# Patient Record
(demographics unavailable — no encounter records)

---

## 2024-11-23 NOTE — DATA REVIEWED
[FreeTextEntry1] : Labs 5/6/23 H/H 17.1/48.5 glucose 187 BUN/cr 10/.86 calcium 10.1 chol 166 tri 59 HDL 53  alb/cr ratio 64 TSH 1.95 Free T4 1.21 25 vitamin D 24.8 celiac disease panel negative  3/2/24 H/H 12.4/36 Hba1c 8.4% BUN/cr 10/.59 chol 123 HDL 39 LDL 73 tri 48 TSH 3.27

## 2024-11-23 NOTE — PHYSICAL EXAM
[Alert] : alert [No Acute Distress] : no acute distress [Normal Sclera/Conjunctiva] : normal sclera/conjunctiva [EOMI] : extra ocular movement intact [No LAD] : no lymphadenopathy [Thyroid Not Enlarged] : the thyroid was not enlarged [No Thyroid Nodules] : no palpable thyroid nodules [No Respiratory Distress] : no respiratory distress [Clear to Auscultation] : lungs were clear to auscultation bilaterally [Normal S1, S2] : normal S1 and S2 [Regular Rhythm] : with a regular rhythm [No Edema] : no peripheral edema [Normal Bowel Sounds] : normal bowel sounds [Not Tender] : non-tender [Not Distended] : not distended [Soft] : abdomen soft [Normal Anterior Cervical Nodes] : no anterior cervical lymphadenopathy [No Clubbing, Cyanosis] : no clubbing  or cyanosis of the fingernails [No Rash] : no rash [Right foot was examined, including] : right foot ~C was examined, including visual inspection with sensory and pulse exams [Left foot was examined, including] : left foot ~C was examined, including visual inspection with sensory and pulse exams [Normal] : normal [2+] : 2+ in the dorsalis pedis [Diminished Throughout Both Feet] : normal tactile sensation with monofilament testing throughout both feet [Normal Reflexes] : deep tendon reflexes were 2+ and symmetric [Normal Affect] : the affect was normal [Normal Mood] : the mood was normal [de-identified] : last foot exam in April 2024

## 2024-11-23 NOTE — ASSESSMENT
[Carbohydrate Consistent Diet] : carbohydrate consistent diet [Diabetes Foot Care] : diabetes foot care [Long Term Vascular Complications] : long term vascular complications of diabetes [Smoking Cessation] : smoking cessation [FreeTextEntry1] : 41 y.o. female with h/o Type 1 DM uncontrolled and vitamin D def.  1. Type 1 DM- Suboptimal control with Hba1c of 8.5% today. Encouraged a carbohydrate consistent diet and proper bolusing for meals and snacks. Omnipod 5 insulin pump download reviewed with 49% in range and 2% lows. Will change ICR to 8. Stressed importance of meal bolus and entering carb amounts. Encouraged patient to follow up with CDE regarding blood glucose levels in 4 to 8 weeks. Encouraged wearing medical alert. Had normal CMP in March 2024. Elevated urine alb/cr ratio in May 2023 and October 2023. We discussed starting ACE-I or ARB for renal protection, but she prefers to hold off for now and will recheck CMP and urine alb/cr ratio today.   2. BP is at goal  3. Hyperlipidemia- Will check lipids. Discussed starting statin for CV risk reduction and she prefers to hold off for now. Patient appears euthyroid. Will check TFTs.   4. Vitamin D def- Low 25 vitamin D level with negative screen for celiac disease in the past. Will check 25 vitamin D level and celiac disease panel. Recommend vitamin D3 2,000 Iu daily.  Follow up in 3 to 4 months.

## 2024-11-23 NOTE — PHYSICAL EXAM
[Alert] : alert [No Acute Distress] : no acute distress [Normal Sclera/Conjunctiva] : normal sclera/conjunctiva [EOMI] : extra ocular movement intact [No LAD] : no lymphadenopathy [Thyroid Not Enlarged] : the thyroid was not enlarged [No Thyroid Nodules] : no palpable thyroid nodules [No Respiratory Distress] : no respiratory distress [Clear to Auscultation] : lungs were clear to auscultation bilaterally [Normal S1, S2] : normal S1 and S2 [Regular Rhythm] : with a regular rhythm [No Edema] : no peripheral edema [Normal Bowel Sounds] : normal bowel sounds [Not Tender] : non-tender [Not Distended] : not distended [Soft] : abdomen soft [Normal Anterior Cervical Nodes] : no anterior cervical lymphadenopathy [No Clubbing, Cyanosis] : no clubbing  or cyanosis of the fingernails [No Rash] : no rash [Right foot was examined, including] : right foot ~C was examined, including visual inspection with sensory and pulse exams [Left foot was examined, including] : left foot ~C was examined, including visual inspection with sensory and pulse exams [Normal] : normal [2+] : 2+ in the dorsalis pedis [Diminished Throughout Both Feet] : normal tactile sensation with monofilament testing throughout both feet [Normal Reflexes] : deep tendon reflexes were 2+ and symmetric [Normal Affect] : the affect was normal [Normal Mood] : the mood was normal [de-identified] : last foot exam in April 2024

## 2024-11-23 NOTE — THERAPY
[Today's Date] : [unfilled] [Novolog] : Novolog [Insulin on Board (IOB) Duration = ____ hours] : Insulin on Board (IOB) Duration  = [unfilled] hours [_____] :  [unfilled] mg/dL/U [de-identified] : Omnipod 5

## 2024-11-23 NOTE — THERAPY
[Today's Date] : [unfilled] [Novolog] : Novolog [Insulin on Board (IOB) Duration = ____ hours] : Insulin on Board (IOB) Duration  = [unfilled] hours [_____] :  [unfilled] mg/dL/U [de-identified] : Omnipod 5

## 2024-11-23 NOTE — REVIEW OF SYSTEMS
[Fatigue] : fatigue [Nausea] : nausea [Headaches] : headaches [Cold Intolerance] : cold intolerance [Negative] : Integumentary [Polyuria] : no polyuria [Irregular Menses] : regular menses [Pain/Numbness of Digits] : no pain/numbness of digits [Polydipsia] : no polydipsia [Swelling] : no swelling [FreeTextEntry7] : abdominal fullness at times

## 2024-11-23 NOTE — HISTORY OF PRESENT ILLNESS
[Continuous Glucose Monitoring] : Continuous Glucose Monitoring: Yes [Dexcom] : Dexcom 98 [FreeTextEntry1] : 41 y.o. female with h/o Type 1 DM uncontrolled, hyperlipidemia and vitamin D def here for follow up visit.   No acute events since the last visit.   S/p right eye surgery on 2/28/24 followed by facial cellulitis and required 5 days at [facility] treated with IV antibiotics.   Diagnosed with COVID-19 related pneumonia and COPD in 2/2023. Seeing pulmonary and started Trelegy but now off.     Did not receive COVID-19 vaccine. Did have COVID-19 in December 2021. She was using Medtronic pump and Dexcom G6.   Now using Omnipod 5 with Dexcom G6 since 12/2023.  Has been following with our CDE for insulin pump adjustments.   Patient reports hyperglycemia during the day. Reports less hypoglycemia but may happen during the day if more active. She is using bolus wizard more and is trying to be more consistent with manual bolus. UTD with glucagon kit, basal insulin and ketone strips at home. Medical alert is being fixed and not wearing it now.   UTD with ophthalmology (2024) and does have h/o retinopathy. S/p right tear duct issue. Still smoking a few cigarettes per day. No foot complaints. No polyuria and no polydipsia. Sees podiatry. Does have proteinuria but did not start ARB. Reports feeling full sooner with occasional nausea but stable. No vomiting or abdominal pain.    Diet: For breakfast: egg sandwich on a small roll For lunch: sandwich, chicken, stew or ravioli For dinner: usually skip and may have similar lunch choices Snacks during the day and usually does bolus Drink: Diet soda, water  Also vitamin d def, not taking vitamin D supplement because forgets. [FreeTextEntry2] : 49 [FreeTextEntry3] : 49 [FreeTextEntry4] : 2 [de-identified] : 7.8%

## 2024-11-23 NOTE — HISTORY OF PRESENT ILLNESS
[Continuous Glucose Monitoring] : Continuous Glucose Monitoring: Yes [Dexcom] : Dexcom [FreeTextEntry1] : 41 y.o. female with h/o Type 1 DM uncontrolled, hyperlipidemia and vitamin D def here for follow up visit.   No acute events since the last visit.   S/p right eye surgery on 2/28/24 followed by facial cellulitis and required 5 days at [facility] treated with IV antibiotics.   Diagnosed with COVID-19 related pneumonia and COPD in 2/2023. Seeing pulmonary and started Trelegy but now off.     Did not receive COVID-19 vaccine. Did have COVID-19 in December 2021. She was using Medtronic pump and Dexcom G6.   Now using Omnipod 5 with Dexcom G6 since 12/2023.  Has been following with our CDE for insulin pump adjustments.   Patient reports hyperglycemia during the day. Reports less hypoglycemia but may happen during the day if more active. She is using bolus wizard more and is trying to be more consistent with manual bolus. UTD with glucagon kit, basal insulin and ketone strips at home. Medical alert is being fixed and not wearing it now.   UTD with ophthalmology (2024) and does have h/o retinopathy. S/p right tear duct issue. Still smoking a few cigarettes per day. No foot complaints. No polyuria and no polydipsia. Sees podiatry. Does have proteinuria but did not start ARB. Reports feeling full sooner with occasional nausea but stable. No vomiting or abdominal pain.    Diet: For breakfast: egg sandwich on a small roll For lunch: sandwich, chicken, stew or ravioli For dinner: usually skip and may have similar lunch choices Snacks during the day and usually does bolus Drink: Diet soda, water  Also vitamin d def, not taking vitamin D supplement because forgets. [FreeTextEntry2] : 49 [FreeTextEntry3] : 49 [FreeTextEntry4] : 2 [de-identified] : 7.8%

## 2025-04-02 NOTE — PHYSICAL EXAM
[Bro Test Lateralizes To Right] : tone lateralization to the right [Midline] : trachea located in midline position [Normal] : no rashes [de-identified] : right TMJ tenderness [Hearing Loss Right Only] : normal [Hearing Loss Left Only] : normal [de-identified] : mildly inflamed turbinates [FreeTextEntry2] : diffusely tender over the right sinuses to percussion

## 2025-04-02 NOTE — DATA REVIEWED
[de-identified] : CT Sinuses 10/9/23: -possible old nasal fracture -small polyp in the floor of the right maxillary sinus -mucosal thickening in the right inferior meatus -deviated septum right with septal spur on the left

## 2025-04-02 NOTE — PROCEDURE
[Recalcitrant Symptoms] : recalcitrant symptoms  [Rigid Endoscope] : examined with a rigid endoscope [de-identified] : Dr. Maldonado [de-identified] : Facial pain, Acute sinusitis [FreeTextEntry6] :  Procedure: Rigid Nasal Endoscopy: Risks, benefits, and alternatives of rigid endoscopy were explained to the patient. The patient gave oral consent to proceed. The rigid scope was inserted into the right nasal cavity. Endoscopy of the inferior and middle meatus was performed. No polyp, mass, or lesion was appreciated. Olfactory cleft was clear. Spheno-ethmoid recess clear. Nasopharynx was clear. Turbinates were without mass, but inflamed right inferior and middle turbinate. The procedure was repeated on the contralateral side with similar findings. The right middle meatus was inflamed. -left middle meatus normal, but inflamed on the right -left middle turbinate normal, but inflamed on the right -inflamed right inferior turbinate

## 2025-04-02 NOTE — ADDENDUM
[FreeTextEntry1] :  Documented by Etienne Vinson acting as scribe for Dr. Maldonado on 04/02/2025. All Medical record entries made by the Scribe were at my, Dr. Maldonado, direction and personally dictated by me on 04/02/2025 . I have reviewed the chart and agree that the record accurately reflects my personal performance of the history, physical exam, assessment and plan. I have also personally directed, reviewed, and agreed with the discharge instructions.

## 2025-04-02 NOTE — ASSESSMENT
[FreeTextEntry1] :  Reviewed and reconciled medications, allergies, PMHx, PSHx, SocHx, FMHx.  Pt. with h/o of COPD, asthma, closed fracture nasal bone, facial pain, and blocked nasolacrimal duct s/p repair presents today stating that she had a sinus infection 4 weeks ago and she is still with ear pain, nasal pain, right cheek pain, and yellow nasal discharge. She denies issues with tearing. She states that her right nose has been dry since she had nasolacrimal duct surgery. She states that she has been on Levaquin from her PCP. She states that her face got very swollen after nasolacrimal duct surgery.  CT Sinuses 10/9/23: -possible old nasal fracture -small polyp in the floor of the right maxillary sinus -mucosal thickening in the right inferior meatus -deviated septum right with septal spur on the left  Physical exam: -NOSE score 40 -TNSS 4 -ears appear normal bilaterally -tuning forks lateralizes to the right ear -mildly inflamed turbinates -right TMJ tenderness -diffusely tender over the right sinuses to percussion  ENT Procedure: Rigid nasal endoscopy -left middle meatus normal, but inflamed on the right -left middle turbinate normal, but inflamed on the right -inflamed right inferior turbinate  Plan: CT scan reviewed and discussed with the patient. -Continue Levaquin -Use Afrin for 3 days -Ordered CT Sinuses for potential surgical planning once sinus infection resolves -FU with results from CT scan

## 2025-04-02 NOTE — CONSULT LETTER
[Dear  ___] : Dear  [unfilled], [Courtesy Letter:] : I had the pleasure of seeing your patient, [unfilled], in my office today. [Please see my note below.] : Please see my note below. [Consult Closing:] : Thank you very much for allowing me to participate in the care of this patient.  If you have any questions, please do not hesitate to contact me. [Sincerely,] : Sincerely, [FreeTextEntry3] :  Gonzalo Maldonado MD FACS

## 2025-05-31 NOTE — THERAPY
[Today's Date] : [unfilled] [_____] :  [unfilled] mg/dL [Insulin on Board (IOB) Duration = ____ hours] : Insulin on Board (IOB) Duration  = [unfilled] hours [Other:___] : [unfilled] [de-identified] : Omnipod 5

## 2025-05-31 NOTE — HISTORY OF PRESENT ILLNESS
[Continuous Glucose Monitoring] : Continuous Glucose Monitoring: Yes [Dexcom] : Dexcom [FreeTextEntry1] : 42 y.o. female with h/o Type 1 DM uncontrolled, hyperlipidemia and vitamin D def here for follow up visit.   No acute events since the last visit.   S/p right eye surgery on 2/28/24 followed by facial cellulitis and required 5 days at [facility] treated with IV antibiotics.   Diagnosed with COVID-19 related pneumonia and COPD in 2/2023. Seeing pulmonary and started Trelegy but now off.     Did not receive COVID-19 vaccine. Did have COVID-19 in December 2021. She was using Medtronic pump and Dexcom G6.   Now using Omnipod 5 with Dexcom G6 since 12/2023.  Has been following with our CDE for insulin pump adjustments.   Patient reports hypoglycemia after 5 pm since more active. She reports using bolus wizard more but will bolus after starts eating about 5 to 10 minutes into the meal.   UTD with glucagon kit, basal insulin and ketone strips at home. Medical alert is fixed but not wearing it now.   UTD with ophthalmology (2025) and does have h/o retinopathy. S/p right tear duct issue. Still smoking a few cigarettes per day. No foot complaints. No polyuria and no polydipsia. Sees podiatry. Does have proteinuria but did not start ARB. Reports feeling full sooner with occasional nausea but stable. No vomiting or abdominal pain.    Diet: For breakfast: egg sandwich on a small roll For lunch: sandwich, chicken, stew or ravioli For dinner: usually skip and may have similar lunch choices Snacks during the day and usually does bolus Drink: Diet soda, water  Also vitamin d def, not taking vitamin D supplement because forgets. [FreeTextEntry2] : 48 [FreeTextEntry3] : 51 [FreeTextEntry4] : 1 [de-identified] : 7.9%

## 2025-05-31 NOTE — ASSESSMENT
[Carbohydrate Consistent Diet] : carbohydrate consistent diet [Diabetes Foot Care] : diabetes foot care [Long Term Vascular Complications] : long term vascular complications of diabetes [Smoking Cessation] : smoking cessation [FreeTextEntry1] : 42 y.o. female with h/o Type 1 DM uncontrolled and vitamin D def.  1. Type 1 DM- Suboptimal control with Hba1c of 8.2% today. Encouraged a carbohydrate consistent diet and proper bolusing for meals and snacks. Omnipod 5 insulin pump download reviewed with 48% in range, 51% highs and 1% lows. Stressed importance of meal bolus and entering carb amounts. Recommend doing meal bolus 10 to 15 minutes before starting to eat. She is in automated mode only 82% of the time so recommend making sure she stays in automated mode consistently. Encouraged patient to follow up with CDE regarding blood glucose levels in 4 to 8 weeks. Encouraged wearing medical alert. Elevated urine alb/cr ratio in May 2023 and October 2023. We discussed starting ACE-I or ARB for renal protection, but she prefers to hold off for now and will recheck CMP and urine alb/cr ratio today.   2. BP is at goal  3. Hyperlipidemia- Will check lipids. Discussed starting statin for CV risk reduction and she prefers to hold off for now. Patient appears euthyroid. Will check TFTs.   4. Vitamin D def- Low 25 vitamin D level with negative screen for celiac disease in the past. Will check 25 vitamin D level. She had normal celiac disease panel in November 2024. Recommend vitamin D3 2,000 Iu daily.  Follow up in 3 to 4 months.

## 2025-05-31 NOTE — THERAPY
[Today's Date] : [unfilled] [_____] :  [unfilled] mg/dL [Insulin on Board (IOB) Duration = ____ hours] : Insulin on Board (IOB) Duration  = [unfilled] hours [Other:___] : [unfilled] [de-identified] : Omnipod 5

## 2025-05-31 NOTE — PHYSICAL EXAM
[Alert] : alert [No Acute Distress] : no acute distress [Normal Sclera/Conjunctiva] : normal sclera/conjunctiva [EOMI] : extra ocular movement intact [No LAD] : no lymphadenopathy [Thyroid Not Enlarged] : the thyroid was not enlarged [No Thyroid Nodules] : no palpable thyroid nodules [No Respiratory Distress] : no respiratory distress [Clear to Auscultation] : lungs were clear to auscultation bilaterally [Normal S1, S2] : normal S1 and S2 [Regular Rhythm] : with a regular rhythm [No Edema] : no peripheral edema [Normal Bowel Sounds] : normal bowel sounds [Not Tender] : non-tender [Not Distended] : not distended [Soft] : abdomen soft [Normal Anterior Cervical Nodes] : no anterior cervical lymphadenopathy [No Clubbing, Cyanosis] : no clubbing  or cyanosis of the fingernails [No Rash] : no rash [Normal Reflexes] : deep tendon reflexes were 2+ and symmetric [Normal Affect] : the affect was normal [Normal Mood] : the mood was normal [Right foot was examined, including] : right foot ~C was examined, including visual inspection with sensory and pulse exams [Left foot was examined, including] : left foot ~C was examined, including visual inspection with sensory and pulse exams [Normal] : normal [2+] : 2+ in the dorsalis pedis [Diminished Throughout Both Feet] : normal tactile sensation with monofilament testing throughout both feet

## 2025-05-31 NOTE — HISTORY OF PRESENT ILLNESS
[Continuous Glucose Monitoring] : Continuous Glucose Monitoring: Yes [Dexcom] : Dexcom [FreeTextEntry1] : 42 y.o. female with h/o Type 1 DM uncontrolled, hyperlipidemia and vitamin D def here for follow up visit.   No acute events since the last visit.   S/p right eye surgery on 2/28/24 followed by facial cellulitis and required 5 days at [facility] treated with IV antibiotics.   Diagnosed with COVID-19 related pneumonia and COPD in 2/2023. Seeing pulmonary and started Trelegy but now off.     Did not receive COVID-19 vaccine. Did have COVID-19 in December 2021. She was using Medtronic pump and Dexcom G6.   Now using Omnipod 5 with Dexcom G6 since 12/2023.  Has been following with our CDE for insulin pump adjustments.   Patient reports hypoglycemia after 5 pm since more active. She reports using bolus wizard more but will bolus after starts eating about 5 to 10 minutes into the meal.   UTD with glucagon kit, basal insulin and ketone strips at home. Medical alert is fixed but not wearing it now.   UTD with ophthalmology (2025) and does have h/o retinopathy. S/p right tear duct issue. Still smoking a few cigarettes per day. No foot complaints. No polyuria and no polydipsia. Sees podiatry. Does have proteinuria but did not start ARB. Reports feeling full sooner with occasional nausea but stable. No vomiting or abdominal pain.    Diet: For breakfast: egg sandwich on a small roll For lunch: sandwich, chicken, stew or ravioli For dinner: usually skip and may have similar lunch choices Snacks during the day and usually does bolus Drink: Diet soda, water  Also vitamin d def, not taking vitamin D supplement because forgets. [FreeTextEntry2] : 48 [FreeTextEntry3] : 51 [FreeTextEntry4] : 1 [de-identified] : 7.9%